# Patient Record
Sex: MALE | Race: OTHER | NOT HISPANIC OR LATINO | URBAN - METROPOLITAN AREA
[De-identification: names, ages, dates, MRNs, and addresses within clinical notes are randomized per-mention and may not be internally consistent; named-entity substitution may affect disease eponyms.]

---

## 2019-11-27 ENCOUNTER — EMERGENCY (EMERGENCY)
Facility: HOSPITAL | Age: 39
LOS: 1 days | Discharge: HOME | End: 2019-11-27
Admitting: EMERGENCY MEDICINE
Payer: COMMERCIAL

## 2019-11-27 VITALS
TEMPERATURE: 98 F | OXYGEN SATURATION: 100 % | DIASTOLIC BLOOD PRESSURE: 90 MMHG | HEART RATE: 73 BPM | SYSTOLIC BLOOD PRESSURE: 134 MMHG | RESPIRATION RATE: 18 BRPM

## 2019-11-27 PROCEDURE — 99283 EMERGENCY DEPT VISIT LOW MDM: CPT

## 2019-11-27 RX ORDER — LIDOCAINE 4 G/100G
1 CREAM TOPICAL ONCE
Refills: 0 | Status: COMPLETED | OUTPATIENT
Start: 2019-11-27 | End: 2019-11-27

## 2019-11-27 RX ORDER — IBUPROFEN 200 MG
600 TABLET ORAL ONCE
Refills: 0 | Status: COMPLETED | OUTPATIENT
Start: 2019-11-27 | End: 2019-11-27

## 2019-11-27 RX ORDER — CYCLOBENZAPRINE HYDROCHLORIDE 10 MG/1
1 TABLET, FILM COATED ORAL
Qty: 15 | Refills: 0
Start: 2019-11-27 | End: 2019-12-01

## 2019-11-27 RX ORDER — KETOROLAC TROMETHAMINE 30 MG/ML
30 SYRINGE (ML) INJECTION ONCE
Refills: 0 | Status: DISCONTINUED | OUTPATIENT
Start: 2019-11-27 | End: 2019-11-27

## 2019-11-27 RX ORDER — IBUPROFEN 200 MG
1 TABLET ORAL
Qty: 20 | Refills: 0
Start: 2019-11-27 | End: 2019-12-01

## 2019-11-27 RX ORDER — CYCLOBENZAPRINE HYDROCHLORIDE 10 MG/1
10 TABLET, FILM COATED ORAL ONCE
Refills: 0 | Status: COMPLETED | OUTPATIENT
Start: 2019-11-27 | End: 2019-11-27

## 2019-11-27 RX ADMIN — Medication 600 MILLIGRAM(S): at 13:38

## 2019-11-27 RX ADMIN — LIDOCAINE 1 PATCH: 4 CREAM TOPICAL at 13:23

## 2019-11-27 RX ADMIN — CYCLOBENZAPRINE HYDROCHLORIDE 10 MILLIGRAM(S): 10 TABLET, FILM COATED ORAL at 13:23

## 2019-11-27 NOTE — ED PROVIDER NOTE - MUSCULOSKELETAL, MLM
Post-Care Instructions: I reviewed with the patient in detail post-care instructions. Patient is to wear sunprotection, and avoid picking at any of the treated lesions. Pt may apply Vaseline to crusted or scabbing areas. Duration Of Freeze Thaw-Cycle (Seconds): 0 Spine appears normal, range of motion is not limited, no muscle or joint tenderness. No vertebral midline tenderness. Consent: The patient's consent was obtained including but not limited to risks of crusting, scabbing, blistering, scarring, darker or lighter pigmentary change, recurrence, incomplete removal and infection. Detail Level: Detailed Render Post-Care Instructions In Note?: no
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
## 2019-11-27 NOTE — ED PROVIDER NOTE - PATIENT PORTAL LINK FT
You can access the FollowMyHealth Patient Portal offered by [facility] by registering at the following website: [email or web address]. By joining [employer]’s FollowMyHealth portal, you will also be able to view your health information using other applications (apps) compatible with our system.

## 2019-11-27 NOTE — ED PROVIDER NOTE - NSFOLLOWUPINSTRUCTIONS_ED_ALL_ED_FT
Please take medications as prescribed: Ibuprofen 600mg every 6 hours, and Flexeril 10mg every 8 hours as needed for pain.  If you have any new, worsened or concerning symptoms, please return to the emergency room immediately.  Please follow up with your primary care doctor in 1-2 days.
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
## 2023-08-20 ENCOUNTER — EMERGENCY (EMERGENCY)
Facility: HOSPITAL | Age: 43
LOS: 1 days | Discharge: HOME | End: 2023-08-20
Attending: STUDENT IN AN ORGANIZED HEALTH CARE EDUCATION/TRAINING PROGRAM | Admitting: EMERGENCY MEDICINE
Payer: COMMERCIAL

## 2023-08-20 VITALS
RESPIRATION RATE: 18 BRPM | DIASTOLIC BLOOD PRESSURE: 97 MMHG | SYSTOLIC BLOOD PRESSURE: 123 MMHG | OXYGEN SATURATION: 99 % | TEMPERATURE: 98 F | HEART RATE: 80 BPM

## 2023-08-20 VITALS
OXYGEN SATURATION: 100 % | TEMPERATURE: 98 F | SYSTOLIC BLOOD PRESSURE: 124 MMHG | HEART RATE: 65 BPM | DIASTOLIC BLOOD PRESSURE: 88 MMHG | RESPIRATION RATE: 18 BRPM

## 2023-08-20 LAB
ALBUMIN SERPL ELPH-MCNC: 4.5 G/DL — SIGNIFICANT CHANGE UP (ref 3.3–5)
ALP SERPL-CCNC: 61 U/L — SIGNIFICANT CHANGE UP (ref 40–120)
ALT FLD-CCNC: 15 U/L — SIGNIFICANT CHANGE UP (ref 4–41)
ANION GAP SERPL CALC-SCNC: 11 MMOL/L — SIGNIFICANT CHANGE UP (ref 7–14)
AST SERPL-CCNC: 18 U/L — SIGNIFICANT CHANGE UP (ref 4–40)
BASOPHILS # BLD AUTO: 0.03 K/UL — SIGNIFICANT CHANGE UP (ref 0–0.2)
BASOPHILS NFR BLD AUTO: 0.2 % — SIGNIFICANT CHANGE UP (ref 0–2)
BILIRUB SERPL-MCNC: 0.4 MG/DL — SIGNIFICANT CHANGE UP (ref 0.2–1.2)
BUN SERPL-MCNC: 9 MG/DL — SIGNIFICANT CHANGE UP (ref 7–23)
CALCIUM SERPL-MCNC: 9.5 MG/DL — SIGNIFICANT CHANGE UP (ref 8.4–10.5)
CHLORIDE SERPL-SCNC: 101 MMOL/L — SIGNIFICANT CHANGE UP (ref 98–107)
CO2 SERPL-SCNC: 25 MMOL/L — SIGNIFICANT CHANGE UP (ref 22–31)
CREAT SERPL-MCNC: 0.9 MG/DL — SIGNIFICANT CHANGE UP (ref 0.5–1.3)
EGFR: 107 ML/MIN/1.73M2 — SIGNIFICANT CHANGE UP
EOSINOPHIL # BLD AUTO: 0.04 K/UL — SIGNIFICANT CHANGE UP (ref 0–0.5)
EOSINOPHIL NFR BLD AUTO: 0.3 % — SIGNIFICANT CHANGE UP (ref 0–6)
GLUCOSE SERPL-MCNC: 104 MG/DL — HIGH (ref 70–99)
HCT VFR BLD CALC: 47.3 % — SIGNIFICANT CHANGE UP (ref 39–50)
HGB BLD-MCNC: 15.4 G/DL — SIGNIFICANT CHANGE UP (ref 13–17)
IANC: 10.93 K/UL — HIGH (ref 1.8–7.4)
IMM GRANULOCYTES NFR BLD AUTO: 0.3 % — SIGNIFICANT CHANGE UP (ref 0–0.9)
LYMPHOCYTES # BLD AUTO: 1.29 K/UL — SIGNIFICANT CHANGE UP (ref 1–3.3)
LYMPHOCYTES # BLD AUTO: 10.1 % — LOW (ref 13–44)
MCHC RBC-ENTMCNC: 28.2 PG — SIGNIFICANT CHANGE UP (ref 27–34)
MCHC RBC-ENTMCNC: 32.6 GM/DL — SIGNIFICANT CHANGE UP (ref 32–36)
MCV RBC AUTO: 86.5 FL — SIGNIFICANT CHANGE UP (ref 80–100)
MONOCYTES # BLD AUTO: 0.38 K/UL — SIGNIFICANT CHANGE UP (ref 0–0.9)
MONOCYTES NFR BLD AUTO: 3 % — SIGNIFICANT CHANGE UP (ref 2–14)
NEUTROPHILS # BLD AUTO: 10.93 K/UL — HIGH (ref 1.8–7.4)
NEUTROPHILS NFR BLD AUTO: 86.1 % — HIGH (ref 43–77)
NRBC # BLD: 0 /100 WBCS — SIGNIFICANT CHANGE UP (ref 0–0)
NRBC # FLD: 0 K/UL — SIGNIFICANT CHANGE UP (ref 0–0)
PLATELET # BLD AUTO: 192 K/UL — SIGNIFICANT CHANGE UP (ref 150–400)
POTASSIUM SERPL-MCNC: 4.3 MMOL/L — SIGNIFICANT CHANGE UP (ref 3.5–5.3)
POTASSIUM SERPL-SCNC: 4.3 MMOL/L — SIGNIFICANT CHANGE UP (ref 3.5–5.3)
PROT SERPL-MCNC: 7.6 G/DL — SIGNIFICANT CHANGE UP (ref 6–8.3)
RBC # BLD: 5.47 M/UL — SIGNIFICANT CHANGE UP (ref 4.2–5.8)
RBC # FLD: 12.3 % — SIGNIFICANT CHANGE UP (ref 10.3–14.5)
SODIUM SERPL-SCNC: 137 MMOL/L — SIGNIFICANT CHANGE UP (ref 135–145)
TROPONIN T, HIGH SENSITIVITY RESULT: <6 NG/L — SIGNIFICANT CHANGE UP
WBC # BLD: 12.71 K/UL — HIGH (ref 3.8–10.5)
WBC # FLD AUTO: 12.71 K/UL — HIGH (ref 3.8–10.5)

## 2023-08-20 PROCEDURE — 99285 EMERGENCY DEPT VISIT HI MDM: CPT

## 2023-08-20 PROCEDURE — 70496 CT ANGIOGRAPHY HEAD: CPT | Mod: 26,MA

## 2023-08-20 PROCEDURE — 70450 CT HEAD/BRAIN W/O DYE: CPT | Mod: 26,MA,59

## 2023-08-20 PROCEDURE — 71046 X-RAY EXAM CHEST 2 VIEWS: CPT | Mod: 26

## 2023-08-20 PROCEDURE — 70498 CT ANGIOGRAPHY NECK: CPT | Mod: 26,MA

## 2023-08-20 RX ORDER — ACETAMINOPHEN 500 MG
650 TABLET ORAL ONCE
Refills: 0 | Status: COMPLETED | OUTPATIENT
Start: 2023-08-20 | End: 2023-08-20

## 2023-08-20 RX ORDER — ONDANSETRON 8 MG/1
1 TABLET, FILM COATED ORAL
Qty: 15 | Refills: 0
Start: 2023-08-20

## 2023-08-20 RX ORDER — SODIUM CHLORIDE 9 MG/ML
1000 INJECTION INTRAMUSCULAR; INTRAVENOUS; SUBCUTANEOUS ONCE
Refills: 0 | Status: COMPLETED | OUTPATIENT
Start: 2023-08-20 | End: 2023-08-20

## 2023-08-20 RX ORDER — MECLIZINE HCL 12.5 MG
25 TABLET ORAL ONCE
Refills: 0 | Status: COMPLETED | OUTPATIENT
Start: 2023-08-20 | End: 2023-08-20

## 2023-08-20 RX ORDER — MECLIZINE HCL 12.5 MG
1 TABLET ORAL
Qty: 15 | Refills: 0
Start: 2023-08-20

## 2023-08-20 RX ORDER — METOCLOPRAMIDE HCL 10 MG
10 TABLET ORAL ONCE
Refills: 0 | Status: COMPLETED | OUTPATIENT
Start: 2023-08-20 | End: 2023-08-20

## 2023-08-20 RX ADMIN — Medication 25 MILLIGRAM(S): at 14:30

## 2023-08-20 RX ADMIN — Medication 10 MILLIGRAM(S): at 19:22

## 2023-08-20 RX ADMIN — SODIUM CHLORIDE 2000 MILLILITER(S): 9 INJECTION INTRAMUSCULAR; INTRAVENOUS; SUBCUTANEOUS at 14:30

## 2023-08-20 RX ADMIN — Medication 650 MILLIGRAM(S): at 19:22

## 2023-08-20 NOTE — ED ADULT TRIAGE NOTE - CHIEF COMPLAINT QUOTE
Pt AOX4 c/o dizziness and slight nausea, mild 1-2/10 headache; went to bed took some allergy medication and a Gas-X and woke up at 5am feeling room spinning; went back to sleep and felt similar room-spinning when he woke up at 9am; no facial droop, no speech changes, bilateral extremities equally strong and responsive, no drift; pt has Hx of asthma w/ intermittent inhaler use

## 2023-08-20 NOTE — ED ADULT NURSE NOTE - OBJECTIVE STATEMENT
patient alert and oriented x4 ambulatory, c/o dizziness, nausea and mild headache. patient states he took allergy medication last night and woke up this morning with dizziness (feeling like the room around him is spinning). patient is well appearing, no acute distress noted. patient with no neuro deficits. 20G IV placed in RAC, labs sent. medication given per EMAR.

## 2023-08-20 NOTE — ED PROVIDER NOTE - CLINICAL SUMMARY MEDICAL DECISION MAKING FREE TEXT BOX
43-year-old male past medical history of asthma, discectomy lumbar presents to ED for evaluation of vertiginous room spinning dizziness since 4 AM last night A/P Labs, imaging, medicate, reassess

## 2023-08-20 NOTE — ED ADULT NURSE REASSESSMENT NOTE - NS ED NURSE REASSESS COMMENT FT1
Pt A&Ox4 resting on stretcher in RW. Respirations even and unlabored. Pt offers no complaints at this time. Repeat troponin sent per order. Pending results. Bed in lowest, safety maintained.

## 2023-08-20 NOTE — ED PROVIDER NOTE - PATIENT PORTAL LINK FT
You can access the FollowMyHealth Patient Portal offered by [facility] by registering at the following website: [email or web address]. By joining [employer]’s FollowMyHealth portal, you will also be able to view your health information using other applications (apps) compatible with our system.

## 2023-08-20 NOTE — ED PROVIDER NOTE - OBJECTIVE STATEMENT
43-year-old male past medical history of asthma, discectomy lumbar presents to ED for evaluation of vertiginous room spinning dizziness since 4 AM last night.  Last known normal 1 AM when he felt abdominal bloating took Gas-X.  Denies fevers, chills, chest pain, shortness of breath.  Reports mild nausea with nonbloody vomiting associated with the dizziness.  Reports dizziness is worsened with turning his head and some positional changes.  Had similar symptoms about 1 month ago which resolved spontaneously without intervention.  Reports this time symptoms are more persistent.  Denies sick contacts, recent travel, urinary symptoms, diarrhea.  No known drug allergies.  Denies smoking, drinking, drug use.  Denies numbness or weakness.  Denies injuries or trauma

## 2023-08-20 NOTE — CONSULT NOTE ADULT - ASSESSMENT
ASSESSMENT   FIDENCIO ADAM, 43y (1980) M w/ PMHx significant for asthma, discectomy lumbar presents to ED for evaluation of vertiginous room spinning dizziness. Patient reports the dizziness is worsened with head movement and decreased after he has sat still for a while. He states he has had 5 - 10 episodes while in the ED and his symptoms have slightly improved with the use of meclizine. Patient states he has had 1 similar episode a few months ago but it was lesser in intensity which resolved spontaneously without intervention. Reports mild nausea with nonbloody vomiting associated with the dizziness.  Reports dizziness is worsened with turning his head and some positional changes. Patient reports double vision with sustained left gaze.    IMPRESSION   Room spinning dizziness, 30 second episodes worsened with head movements. Nystagmus on HINTS exam. Localizes to inner ear. Consistent with BPPV    RECOMMENDATION   [] CTA H/N  [] BP measurements in both arms + orthostatic VS  [] EKG to evaluate for QT prolongation. Consider against zofran if QTc > 500ms  [] consider IVFs  [] Meclizine 12.5mg BID PRN (at most increase to 25mg Q8)  [] Ondansetron 8mg up to q8hr PRN OR Reglan 4mg PRN q8h PRN  [] Diazepam 5mg now then Q8H PRN (for 2 to 3 days)  [] Refer for Vestibular Rehab on discharge  [] ENT f/u outpatient  [] Neurology f/u outpatient     Patient to be seen by team and attending. Note finalized upon attending attestation.

## 2023-08-20 NOTE — CONSULT NOTE ADULT - SUBJECTIVE AND OBJECTIVE BOX
Neurology - Consult Note    -  Spectra: [id number] ([facility]), [phone] ([facility]  -    HPI: FIDENCIO ADAM, 43y (1980) M/F w/ PMHx significant for asthma, discectomy lumbar presents to ED for evaluation of vertiginous room spinning dizziness. Patient reports he woke at around 4:30 AM 8/20/2023 due to a sensation of room-spinning dizziness. He is unsure if it was initially provoked with head movement but upon waking he began experiencing brief 30 second episode of room spinning dizziness. He went to the bathroom and felt nauseated though was unable to vomit. he went back to sleep and woke again at 9 AM with the same symptoms prompting him to visit the ED. Of note, prior to sleeping he took a gas-x pill due to a feeling of bloating. Patient reports the dizziness is worsened with head movement and decreased after he has sat still for a while. He states he has had 5 - 10 episodes while in the ED and his symptoms have slightly improved with the use of meclizine. Patient states he has had 1 similar episode a few months ago but it was lesser in intensity which resolved spontaneously without intervention. Patient denies fevers, chills, chest pain, shortness of breath.  Reports mild nausea with nonbloody vomiting associated with the dizziness.  Reports dizziness is worsened with turning his head and some positional changes. Patient reports double vision with sustained left gaze. Patient denies hearing changes, current nausea, numbness or focal weakness.    Review of Systems:  All other review of systems is negative unless indicated above.    Allergies:  No Known Allergies      PMHx/PSHx/Family Hx: As above, otherwise see below   No pertinent past medical history    Asthma    GERD (gastroesophageal reflux disease)        Social Hx:  No current use of tobacco, alcohol, or illicit drugs  Lives with ***    Medications:  MEDICATIONS  (STANDING):    MEDICATIONS  (PRN):      Vitals:  T(C): 36.4 (08-20-23 @ 16:42), Max: 36.7 (08-20-23 @ 10:38)  HR: 65 (08-20-23 @ 16:42) (65 - 80)  BP: 124/88 (08-20-23 @ 16:42) (123/97 - 124/88)  RR: 18 (08-20-23 @ 16:42) (18 - 18)  SpO2: 100% (08-20-23 @ 16:42) (99% - 100%)    Physical Examination:  General - NAD  Cardiovascular - Peripheral pulses palpable, no edema  Eyes - Fundoscopy not performed due to safety precautions in the setting of infection risk    Neurologic Exam:  Mental status - Awake, Alert, Oriented to person, place, and time. Speech fluent, repetition and naming intact. Follows simple and complex commands. Attention/concentration, recent and remote memory (including registration and recall), and fund of knowledge intact    Cranial nerves - PERRLA, VFF, EOMI, face sensation (V1-V3) intact b/l, facial strength intact without asymmetry b/l, hearing intact b/l, palate with symmetric elevation, trapezius 5/5 strength b/l, tongue midline on protrusion with full lateral movement. Left beating nystagmus with double vision on sustained left gaze    Motor - Normal bulk and tone throughout. No pronator drift.  Strength testing            R        Deltoid:  5    Biceps:  5    Triceps:  5    Wrist Extension:  5    Wrist Flexion:  5    Interossei:  5    :  5    Hip Flexion:  5    Hip Extension:  5    Knee Flexion:  5    Knee Extension:  5    Dorsiflexion:  5    Plantar Flexion:  5        L        Deltoid:  5    Biceps:  5    Triceps:  5    Wrist Extension:  5    Wrist Flexion:  5    Interossei:  5    :  5    Hip Flexion:  5    Hip Extension:  5    Knee Flexion:  5    Knee Extension:  5    Dorsiflexion:  5    Plantar Flexion:  5      Sensation - Light touch/temperature intact throughout    DTR's -               R  Biceps:  2+    Triceps:  2+    Brachioradialis:  2+    Patellar:  2+    Ankle:  2+    Toes/plantar response:  Down    L  Biceps:  2+    Triceps:  2+    Brachioradialis:  2+    Patellar:  2+    Ankle:  2+    Toes/plantar response:  Down    Coordination - Finger to Nose intact b/l. No tremors appreciated    Gait and station - Gait not tested due to patient safety concerns    Labs:                        15.4   12.71 )-----------( 192      ( 20 Aug 2023 14:40 )             47.3     08-20    137  |  101  |  9   ----------------------------<  104<H>  4.3   |  25  |  0.90    Ca    9.5      20 Aug 2023 14:40    TPro  7.6  /  Alb  4.5  /  TBili  0.4  /  DBili  x   /  AST  18  /  ALT  15  /  AlkPhos  61  08-20    CAPILLARY BLOOD GLUCOSE        LIVER FUNCTIONS - ( 20 Aug 2023 14:40 )  Alb: 4.5 g/dL / Pro: 7.6 g/dL / ALK PHOS: 61 U/L / ALT: 15 U/L / AST: 18 U/L / GGT: x               CSF:                  Radiology:

## 2023-08-20 NOTE — ED PROVIDER NOTE - PHYSICAL EXAMINATION
Gen: no acute distress, well appearing, awake, alert and oriented x 3  Cardiac: regular rate and rhythm, +S1S2  Pulm: Clear to auscultation bilaterally  Abd: soft, nontender, nondistended, no guarding  Back: neg CVA ttp, nontender spine  Extremity: no edema, no deformity, warm and well perfused, FROM all extremities    Neuro: awake, alert, oriented x 3, sensorimotor intact, cerebellar intact, CN II-XII grossly normal, speech normal, no facial droop, normal gait, +horizontal nystagmus noted  Psych: normal affect

## 2023-08-20 NOTE — ED PROVIDER NOTE - NSFOLLOWUPINSTRUCTIONS_ED_ALL_ED_FT
English    Vertigo  Vertigo is the feeling that you or your surroundings are moving when they are not. This feeling can come and go at any time. Vertigo often goes away on its own. Vertigo can be dangerous if it occurs while you are doing something that could endanger yourself or others, such as driving or operating machinery.    Your health care provider will do tests to try to determine the cause of your vertigo. Tests will also help your health care provider decide how best to treat your condition.    Follow these instructions at home:  Eating and drinking    A comparison of three sample cups showing dark yellow, yellow, and pale yellow urine.  A sign showing that a person should not drink beer, wine, or liquor.  Dehydration can make vertigo worse. Drink enough fluid to keep your urine pale yellow.  Do not drink alcohol.  Activity    Return to your normal activities as told by your health care provider. Ask your health care provider what activities are safe for you.  In the morning, first sit up on the side of the bed. When you feel okay, stand slowly while you hold onto something until you know that your balance is fine.  Move slowly. Avoid sudden body or head movements or certain positions, as told by your health care provider.  If you have trouble walking or keeping your balance, try using a cane for stability. If you feel dizzy or unstable, sit down right away.  Avoid doing any tasks that would cause danger to you or others if vertigo occurs.  Avoid bending down if you feel dizzy. Place items in your home so that they are easy for you to reach without bending or leaning over.  Do not drive or use machinery if you feel dizzy.  General instructions    Take over-the-counter and prescription medicines only as told by your health care provider.  Keep all follow-up visits. This is important.  Contact a health care provider if:  Your medicines do not relieve your vertigo or they make it worse.  Your condition gets worse or you develop new symptoms.  You have a fever.  You develop nausea or vomiting, or if nausea gets worse.  Your family or friends notice any behavioral changes.  You have numbness or a prickling and tingling sensation in part of your body.  Get help right away if you:  Are always dizzy or you faint.  Develop severe headaches.  Develop a stiff neck.  Develop sensitivity to light.  Have difficulty moving or speaking.  Have weakness in your hands, arms, or legs.  Have changes in your hearing or vision.  These symptoms may represent a serious problem that is an emergency. Do not wait to see if the symptoms will go away. Get medical help right away. Call your local emergency services (911 in the [place] Do not drive yourself to the hospital.    Summary  Vertigo is the feeling that you or your surroundings are moving when they are not.  Your health care provider will do tests to try to determine the cause of your vertigo.  Follow instructions for home care. You may be told to avoid certain tasks, positions, or movements.  Contact a health care provider if your medicines do not relieve your symptoms, or if you have a fever, nausea, vomiting, or changes in behavior.  Get help right away if you have severe headaches or difficulty speaking, or you develop hearing or vision problems.  This information is not intended to replace advice given to you by your health care provider. Make sure you discuss any questions you have with your health care provider.    Document Revised: 11/17/2021 Document Reviewed: 11/17/2021  [employer] Patient Education © 2023 [employer]

## 2023-08-21 RX ORDER — MECLIZINE HCL 12.5 MG
25 TABLET ORAL ONCE
Refills: 0 | Status: COMPLETED | OUTPATIENT
Start: 2023-08-21 | End: 2023-08-21

## 2023-08-21 RX ORDER — METOCLOPRAMIDE HCL 10 MG
10 TABLET ORAL ONCE
Refills: 0 | Status: COMPLETED | OUTPATIENT
Start: 2023-08-21 | End: 2023-08-21

## 2023-08-21 RX ADMIN — Medication 10 MILLIGRAM(S): at 00:15

## 2023-08-21 RX ADMIN — Medication 25 MILLIGRAM(S): at 00:15
